# Patient Record
Sex: MALE | Race: WHITE
[De-identification: names, ages, dates, MRNs, and addresses within clinical notes are randomized per-mention and may not be internally consistent; named-entity substitution may affect disease eponyms.]

---

## 2019-01-27 ENCOUNTER — HOSPITAL ENCOUNTER (EMERGENCY)
Dept: HOSPITAL 43 - DL.ED | Age: 49
Discharge: HOME | End: 2019-01-27
Payer: MEDICARE

## 2019-01-27 DIAGNOSIS — F17.210: ICD-10-CM

## 2019-01-27 DIAGNOSIS — Z00.00: Primary | ICD-10-CM

## 2019-01-27 LAB
ANION GAP SERPL CALC-SCNC: 13.3 MMOL/L
CHLORIDE SERPL-SCNC: 103 MMOL/L (ref 101–111)
SODIUM SERPL-SCNC: 140 MMOL/L (ref 135–145)

## 2019-01-27 PROCEDURE — G0480 DRUG TEST DEF 1-7 CLASSES: HCPCS

## 2019-01-27 PROCEDURE — 36415 COLL VENOUS BLD VENIPUNCTURE: CPT

## 2019-01-27 PROCEDURE — 99283 EMERGENCY DEPT VISIT LOW MDM: CPT

## 2019-01-27 PROCEDURE — 85025 COMPLETE CBC W/AUTO DIFF WBC: CPT

## 2019-01-27 PROCEDURE — 80053 COMPREHEN METABOLIC PANEL: CPT

## 2019-01-27 NOTE — EDM.PDOC
ED HPI GENERAL MEDICAL PROBLEM





- General


Chief Complaint: General


Stated Complaint: BLOWN TIRE


Time Seen by Provider: 01/27/19 06:11


Source of Information: Reports: Patient


History Limitations: Reports: No Limitations





- History of Present Illness


INITIAL COMMENTS - FREE TEXT/NARRATIVE: 





pt has no c/o. PD states pt acted little "off" though passed sobriety test. pt 

states had a flat and was trying to get it fixed and didn't know the cenex wasn'

t opened. 





- Related Data


 Allergies











Allergy/AdvReac Type Severity Reaction Status Date / Time


 


No Known Allergies Allergy   Verified 01/27/19 05:57











Home Meds: 


 Home Meds





. [Unable to Verify Home Med List]  01/27/19 [History]











Past Medical History





- Past Surgical History


Musculoskeletal Surgical History: Reports: Other (See Below)


Other Musculoskeletal Surgeries/Procedures:: ankle surgery





Social & Family History





- Tobacco Use


Smoking Status *Q: Current Some Day Smoker


Years of Tobacco use: 30


Packs/Tins Daily: 0.5





- Caffeine Use


Caffeine Use: Reports: Coffee, Energy Drinks, Soda





- Recreational Drug Use


Recreational Drug Use: No





ED ROS GENERAL





- Review of Systems


Review Of Systems: ROS reveals no pertinent complaints other than HPI.





ED EXAM, GENERAL





- Physical Exam


Exam: See Below


Exam Limited By: No Limitations


General Appearance: Alert, WD/WN, No Apparent Distress


Eye Exam: Bilateral Eye: PERRL (pupils ER @ 4mm)


Ears: Hearing Grossly Normal


Throat/Mouth: Normal Voice, No Airway Compromise


Head: Atraumatic


Neck: Non-Tender, Full Range of Motion


Respiratory/Chest: No Respiratory Distress


Cardiovascular: Regular Rate, Rhythm


GI/Abdominal: Soft, Non-Tender


Neurological: Alert, Oriented, Normal Cognition, Normal Gait, No Motor/Sensory 

Deficits


Psychiatric: Normal Affect, Normal Mood


Skin Exam: Warm, Dry, Normal Color


Lymphatic: No Adenopathy





Course





- Vital Signs


Last Recorded V/S: 


 Last Vital Signs











Temp  35.8 C   01/27/19 05:58


 


Pulse  90   01/27/19 05:58


 


Resp  19   01/27/19 05:58


 


BP  154/69 H  01/27/19 05:58


 


Pulse Ox  100   01/27/19 05:58














- Orders/Labs/Meds


Labs: 


 Laboratory Tests











  01/27/19 01/27/19 Range/Units





  06:09 06:09 


 


WBC  13.0 H   (5.0-10.0)  10^3/uL


 


RBC  4.83   (4.6-6.2)  10^6/uL


 


Hgb  14.9   (14.0-18.0)  g/dL


 


Hct  44.7   (40.0-54.0)  %


 


MCV  92.5   ()  fL


 


MCH  30.8   (27.0-34.0)  pg


 


MCHC  33.3   (33.0-35.0)  g/dL


 


Plt Count  320   (150-450)  10^3/uL


 


Neut % (Auto)  80.5 H   (42.2-75.2)  %


 


Lymph % (Auto)  14.3 L   (20.5-50.1)  %


 


Mono % (Auto)  4.5   (2-8)  %


 


Eos % (Auto)  0.4 L   (1.0-3.0)  %


 


Baso % (Auto)  0.3   (0.0-1.0)  %


 


Sodium   140  (135-145)  mmol/L


 


Potassium   3.3 L  (3.6-5.0)  mmol/L


 


Chloride   103  (101-111)  mmol/L


 


Carbon Dioxide   27.0  (21.0-31.0)  mmol/L


 


Anion Gap   13.3  


 


BUN   15  (7-18)  mg/dL


 


Creatinine   0.7  (0.6-1.3)  mg/dL


 


Est Cr Clr Drug Dosing   133.25  mL/min


 


Estimated GFR (MDRD)   > 60  


 


BUN/Creatinine Ratio   21.42  


 


Glucose   127 H  ()  mg/dL


 


Calcium   9.0  (8.4-10.2)  mg/dl


 


Total Bilirubin   0.7  (0.2-1.0)  mg/dL


 


AST   20  (10-42)  IU/L


 


ALT   20  (10-60)  IU/L


 


Alkaline Phosphatase   78  ()  IU/L


 


Total Protein   7.7  (6.7-8.2)  g/dl


 


Albumin   4.4  (3.2-5.5)  g/dl


 


Globulin   3.3  


 


Albumin/Globulin Ratio   1.33  


 


Ethyl Alcohol   < 5  mg/dL














- Re-Assessments/Exams


Free Text/Narrative Re-Assessment/Exam: 





01/27/19 06:37


results discussed with pt who states wants to get his tire fixed and knows he 

is here to see if he is on drugs. states not so.





Departure





- Departure


Time of Disposition: 06:38


Disposition: Home, Self-Care 01


Condition: Good


Clinical Impression: 


 General medical exam








- Discharge Information


Forms:  ED Department Discharge